# Patient Record
(demographics unavailable — no encounter records)

---

## 2024-10-22 NOTE — HISTORY OF PRESENT ILLNESS
[FreeTextEntry1] : 31-year-old man right-handed history of attention deficit disorder, here for follow-up visit, last time seen in this office May 2024.  He reports doing well, no new medical issues or concerns.  History of abdominal nephritis, has been followed by primary care.  Most recent evaluation being normal. Doing pretty well, continues on Adderall, immediate release 20 mg, usually takes 1 in the morning, sometimes he will split the dose in the afternoon to 10 mg, does not usually use them on the weekend. The medications are helpful, helping focus, more productive. Reports no difficulty with the medication, no trouble with sleep, no mood changes, no headache, no chest pain, no palpitations.

## 2024-10-22 NOTE — DISCUSSION/SUMMARY
[FreeTextEntry1] : 31-year-old man right-handed history of attention deficit disorder, inattentive type.  Doing well, no new complaints or concerns.  Presently prescribed Adderall immediate release, 20 mg 1-2 times a day.  With good results. Reviewed and discussed treatment, no changes at this time. Patient will call for refills as needed. Return to office, 6 months.

## 2024-11-16 NOTE — DATA REVIEWED
[FreeTextEntry1] : On my interpretation of these images in office x-rays Lumbar spine ap/lat 11/13/2024  no vertebra body fractures. Reasonable preservation of disc space heights. mild loss of lumbar lordosis  I stop paperwork reviewed Neuro progress notes reviewed

## 2024-11-16 NOTE — HISTORY OF PRESENT ILLNESS
[de-identified] : 11/13/2024 - 32 Y M presenting for iniital evaluation with chief complaint of right sided low back pain. The patient reports injuring their back approximately one month ago while fielding a ground ball. The back "locked up" for several days following the injury. The patient has experienced gradual improvement over the past 4 weeks. They have been managing the pain with over-the-counter NSAIDs and a few sessions of chiropractic care. The patient denies experiencing any radicular pain, such as leg numbness or tingling.  Injury Details: The patient is a 32 year male who presents today complaining of low back pain, right side Date of Injury/Onset: 10/2024 Pain:    At Rest: 2/10  With Activity:  6/10  Mechanism of injury: picking up a ball, fielding Quality of symptoms: sharp, tightness, stabbing Improves with: over time Worse with: prolonged walking/sitting Prior treatment: chiropractor Prior Imaging: no Additional Information: None

## 2024-11-16 NOTE — PHYSICAL EXAM
[Normal Coordination] : normal coordination [Normal DTR UE/LE] : normal DTR UE/LE  [Normal Sensation] : normal sensation [Normal Mood and Affect] : normal mood and affect [Oriented] : oriented [Able to Communicate] : able to communicate [Normal Skin] : normal skin [No Rash] : no rash [No Ulcers] : no ulcers [No Lesions] : no lesions [No obvious lymphadenopathy in areas examined] : no obvious lymphadenopathy in areas examined [Well Developed] : well developed [Peripheral vascular exam is grossly normal] : peripheral vascular exam is grossly normal [No Respiratory Distress] : no respiratory distress [Lungs clear to auscultation bilaterally] : lungs clear to auscultation bilaterally [Normal Bowel Sounds] : normal bowel sounds [Non-Tender] : non-tender [No HSM] : no HSM [No Mass] : no mass [NL (90)] : forward flexion 90 degrees [NL (30)] : right lateral rotation 30 degrees [NL (45)] : extension 45 degrees [NL (40)] : right lateral bending 40 degrees [Flexion] : flexion [5___] : right extensor hallicus longus 5[unfilled]/5 [] : non-antalgic [FreeTextEntry9] : Diminished forward flexion

## 2024-11-16 NOTE — HISTORY OF PRESENT ILLNESS
[de-identified] : 11/13/2024 - 32 Y M presenting for iniital evaluation with chief complaint of right sided low back pain. The patient reports injuring their back approximately one month ago while fielding a ground ball. The back "locked up" for several days following the injury. The patient has experienced gradual improvement over the past 4 weeks. They have been managing the pain with over-the-counter NSAIDs and a few sessions of chiropractic care. The patient denies experiencing any radicular pain, such as leg numbness or tingling.  Injury Details: The patient is a 32 year male who presents today complaining of low back pain, right side Date of Injury/Onset: 10/2024 Pain:    At Rest: 2/10  With Activity:  6/10  Mechanism of injury: picking up a ball, fielding Quality of symptoms: sharp, tightness, stabbing Improves with: over time Worse with: prolonged walking/sitting Prior treatment: chiropractor Prior Imaging: no Additional Information: None

## 2025-01-08 NOTE — PHYSICAL EXAM
[General Appearance - Alert] : alert [General Appearance - In No Acute Distress] : in no acute distress [General Appearance - Well Nourished] : well nourished [General Appearance - Well Developed] : well developed [Neck Cervical Mass (___cm)] : no neck mass was observed [Jugular Venous Distention Increased] : there was no jugular-venous distention [] : no respiratory distress [Respiration, Rhythm And Depth] : normal respiratory rhythm and effort [Exaggerated Use Of Accessory Muscles For Inspiration] : no accessory muscle use [Auscultation Breath Sounds / Voice Sounds] : lungs were clear to auscultation bilaterally [Heart Rate And Rhythm] : heart rate was normal and rhythm regular [Heart Sounds] : normal S1 and S2 [Heart Sounds Gallop] : no gallops [Abdomen Soft] : soft [Abdomen Tenderness] : non-tender [Abdomen Mass (___ Cm)] : no abdominal mass palpated [No CVA Tenderness] : no ~M costovertebral angle tenderness [Abnormal Walk] : normal gait [Musculoskeletal - Swelling] : no joint swelling seen [Oriented To Time, Place, And Person] : oriented to person, place, and time [Sclera] : the sclera and conjunctiva were normal [Oropharynx] : the oropharynx was normal [Edema] : there was no peripheral edema

## 2025-01-08 NOTE — HISTORY OF PRESENT ILLNESS
[FreeTextEntry1] : Mr. 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 In 2010, he was in high school and diagnosed with mononucleosis. Shortly after that, he developed nephrotic range proteinuria. He was seen at [facility] and a workup was done that revealed no significant secondary causes and serologies were negative. He had over 3gm of proteinuria initially. He had a kidney bx done in 2010 Nov that was not straight forward. It showed LM- one Focal sclerosis and few globally sclerosed glomeruli. His IF was positive for IgG 3+, IgM trace, C3 +, C1q 1-2+ and kappa and lambda trace. Ig A was negative. His EM showed membranous pattern of injury with subepi deposits but atypical features of segmental distribution and had some mesangial and subendo deposits as well. He had 2.3gm of proteinuria at time of kidney bx and normal renal function. He was given ACEI only and doxyclycine. He went into remission slowly. No immunosupp was used. He doesn't recall getting steroids or rituxan or any IV meds.   He was for some reason labelled as FSGS on the charts. He then saw Dr Lutz in [facility] and had a rpt p/crt ratio in 2018 and it was 0.3 off ACEI. 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   Since last visit, he is doing well, no changes. Currently working as a . He did have COVID infection 12/2021- Now he has 3 vaccines COVID. Denies Foamy urine, hematuria.   January 2025: Since last visit in 2022, he has been doing well, denies acute illness or hospitalizations  No infections, vaccines  Went to [place] in 2022 No NSAIDs, herbal supplements  No acute symptoms  He is on Adderall for ADHD Had COVID 2021? No lab since 7/2024

## 2025-01-08 NOTE — REVIEW OF SYSTEMS
[Negative] : Gastrointestinal [Anxiety] : anxiety [Dry Eyes] : no dryness of the eyes [Sore Throat] : no sore throat [Hoarseness] : no hoarseness [Chest Pain] : no chest pain [Palpitations] : no palpitations [Leg Claudication] : no intermittent leg claudication [Lower Ext Edema] : no extremity edema [Cough] : no cough [SOB on Exertion] : no shortness of breath during exertion [Orthopnea] : no orthopnea [Constipation] : no constipation [Melena] : no melena [Dysuria] : no dysuria [Nocturia] : no nocturia [Limb Swelling] : no limb swelling [Skin Lesions] : no skin lesions [Dizziness] : no dizziness [Easy Bleeding] : no tendency for easy bleeding [Easy Bruising] : no tendency for easy bruising

## 2025-01-08 NOTE — ASSESSMENT
[FreeTextEntry1] : Mr. 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 He had some secondary membranous variant that can be perhaps seen with EBV infection. Genetic testing was negative.   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 He was treated only with RAASi which has since been stopped. He has been doing well and in remission.  No protein or blood on UA, uPCR. He is euvolemic but reports episodes of elevated BP which may have happened during episodes of anxiety. - check CMP, uPCR, UA, PLA2r - avoid NSAIDs - monitor home BP daily for next two weeks and text me measurements (phone number provided)  F/U based on lab results

## 2025-07-29 NOTE — REVIEW OF SYSTEMS
[As Noted in HPI] : as noted in HPI [Anxiety] : anxiety [Decr. Concentrating Ability] : decreased concentrating ability [Negative] : Heme/Lymph

## 2025-07-29 NOTE — DISCUSSION/SUMMARY
[FreeTextEntry1] : 32-year-old man right-handed with a history of attention deficit disorder, anxiety, reviewed and discussed his present medications.  He would prefer to stay on the immediate release 20 mg twice a day of Adderall. Reviewed and discussed effects of stimulants on mood, blood pressure and pulse. Discussed strategies to reduce stress recommended a therapist, may benefit from cognitive behavioral therapy. Reviewed and discussed the treatment of hypertension, effects of blood pressure, on long-term cardiovascular health. Recommended continued follow-up with his primary care, nephrologist. Patient will call for refills as needed. Return to office, 6 months.